# Patient Record
Sex: FEMALE | Race: WHITE | NOT HISPANIC OR LATINO
[De-identification: names, ages, dates, MRNs, and addresses within clinical notes are randomized per-mention and may not be internally consistent; named-entity substitution may affect disease eponyms.]

---

## 2018-05-02 ENCOUNTER — APPOINTMENT (OUTPATIENT)
Dept: ULTRASOUND IMAGING | Facility: CLINIC | Age: 48
End: 2018-05-02

## 2018-05-03 ENCOUNTER — APPOINTMENT (OUTPATIENT)
Dept: ULTRASOUND IMAGING | Facility: CLINIC | Age: 48
End: 2018-05-03
Payer: COMMERCIAL

## 2018-05-03 ENCOUNTER — APPOINTMENT (OUTPATIENT)
Dept: MAMMOGRAPHY | Facility: CLINIC | Age: 48
End: 2018-05-03
Payer: COMMERCIAL

## 2018-05-03 ENCOUNTER — OUTPATIENT (OUTPATIENT)
Dept: OUTPATIENT SERVICES | Facility: HOSPITAL | Age: 48
LOS: 1 days | End: 2018-05-03
Payer: COMMERCIAL

## 2018-05-03 DIAGNOSIS — Z00.00 ENCOUNTER FOR GENERAL ADULT MEDICAL EXAMINATION WITHOUT ABNORMAL FINDINGS: ICD-10-CM

## 2018-05-03 PROCEDURE — 77063 BREAST TOMOSYNTHESIS BI: CPT | Mod: 26

## 2018-05-03 PROCEDURE — 77067 SCR MAMMO BI INCL CAD: CPT | Mod: 26

## 2018-05-03 PROCEDURE — 76641 ULTRASOUND BREAST COMPLETE: CPT | Mod: 26,50

## 2018-05-03 PROCEDURE — 77067 SCR MAMMO BI INCL CAD: CPT

## 2018-05-03 PROCEDURE — 77063 BREAST TOMOSYNTHESIS BI: CPT

## 2018-05-03 PROCEDURE — 76641 ULTRASOUND BREAST COMPLETE: CPT

## 2018-05-04 ENCOUNTER — OUTPATIENT (OUTPATIENT)
Dept: OUTPATIENT SERVICES | Facility: HOSPITAL | Age: 48
LOS: 1 days | End: 2018-05-04
Payer: COMMERCIAL

## 2018-05-04 ENCOUNTER — APPOINTMENT (OUTPATIENT)
Dept: ULTRASOUND IMAGING | Facility: CLINIC | Age: 48
End: 2018-05-04

## 2018-05-04 DIAGNOSIS — Z00.00 ENCOUNTER FOR GENERAL ADULT MEDICAL EXAMINATION WITHOUT ABNORMAL FINDINGS: ICD-10-CM

## 2018-05-04 PROCEDURE — 76642 ULTRASOUND BREAST LIMITED: CPT | Mod: 26,RT

## 2018-05-04 PROCEDURE — 76642 ULTRASOUND BREAST LIMITED: CPT

## 2018-05-29 ENCOUNTER — RESULT REVIEW (OUTPATIENT)
Age: 48
End: 2018-05-29

## 2018-06-18 ENCOUNTER — RESULT REVIEW (OUTPATIENT)
Age: 48
End: 2018-06-18

## 2019-06-19 ENCOUNTER — RECORD ABSTRACTING (OUTPATIENT)
Age: 49
End: 2019-06-19

## 2019-06-19 DIAGNOSIS — Z87.59 PERSONAL HISTORY OF OTHER COMPLICATIONS OF PREGNANCY, CHILDBIRTH AND THE PUERPERIUM: ICD-10-CM

## 2019-06-19 DIAGNOSIS — Z86.19 PERSONAL HISTORY OF OTHER INFECTIOUS AND PARASITIC DISEASES: ICD-10-CM

## 2019-06-19 DIAGNOSIS — Z92.89 PERSONAL HISTORY OF OTHER MEDICAL TREATMENT: ICD-10-CM

## 2019-06-19 DIAGNOSIS — Z80.51 FAMILY HISTORY OF MALIGNANT NEOPLASM OF KIDNEY: ICD-10-CM

## 2019-06-19 DIAGNOSIS — Z80.3 FAMILY HISTORY OF MALIGNANT NEOPLASM OF BREAST: ICD-10-CM

## 2019-06-19 DIAGNOSIS — Z83.3 FAMILY HISTORY OF DIABETES MELLITUS: ICD-10-CM

## 2019-06-19 DIAGNOSIS — Z82.49 FAMILY HISTORY OF ISCHEMIC HEART DISEASE AND OTHER DISEASES OF THE CIRCULATORY SYSTEM: ICD-10-CM

## 2019-06-19 DIAGNOSIS — Z78.9 OTHER SPECIFIED HEALTH STATUS: ICD-10-CM

## 2019-06-19 DIAGNOSIS — Z80.52 FAMILY HISTORY OF MALIGNANT NEOPLASM OF BLADDER: ICD-10-CM

## 2019-06-19 LAB — CYTOLOGY CVX/VAG DOC THIN PREP: NORMAL

## 2019-06-20 ENCOUNTER — APPOINTMENT (OUTPATIENT)
Dept: OBGYN | Facility: CLINIC | Age: 49
End: 2019-06-20
Payer: COMMERCIAL

## 2019-06-20 VITALS
HEIGHT: 64 IN | DIASTOLIC BLOOD PRESSURE: 80 MMHG | WEIGHT: 165 LBS | SYSTOLIC BLOOD PRESSURE: 110 MMHG | BODY MASS INDEX: 28.17 KG/M2

## 2019-06-20 DIAGNOSIS — N60.19 DIFFUSE CYSTIC MASTOPATHY OF UNSPECIFIED BREAST: ICD-10-CM

## 2019-06-20 DIAGNOSIS — Z00.00 ENCOUNTER FOR GENERAL ADULT MEDICAL EXAMINATION W/OUT ABNORMAL FINDINGS: ICD-10-CM

## 2019-06-20 DIAGNOSIS — N87.9 DYSPLASIA OF CERVIX UTERI, UNSPECIFIED: ICD-10-CM

## 2019-06-20 LAB
BILIRUB UR QL STRIP: NORMAL
DATE COLLECTED: NORMAL
GLUCOSE UR-MCNC: NORMAL
HCG UR QL: 0.2 EU/DL
HCG UR QL: NEGATIVE
HEMOCCULT SP1 STL QL: NEGATIVE
HGB UR QL STRIP.AUTO: ABNORMAL
KETONES UR-MCNC: NORMAL
LEUKOCYTE ESTERASE UR QL STRIP: NORMAL
NITRITE UR QL STRIP: NORMAL
PH UR STRIP: 6
PROT UR STRIP-MCNC: NORMAL
QUALITY CONTROL: YES
SP GR UR STRIP: 1.02

## 2019-06-20 PROCEDURE — 99396 PREV VISIT EST AGE 40-64: CPT

## 2019-06-20 PROCEDURE — 81025 URINE PREGNANCY TEST: CPT

## 2019-06-20 PROCEDURE — 82270 OCCULT BLOOD FECES: CPT

## 2019-06-20 PROCEDURE — 81003 URINALYSIS AUTO W/O SCOPE: CPT | Mod: QW

## 2019-06-23 PROBLEM — N87.9 CERVICAL DYSPLASIA: Status: ACTIVE | Noted: 2019-06-19

## 2019-06-23 LAB — HPV HIGH+LOW RISK DNA PNL CVX: NOT DETECTED

## 2019-06-23 NOTE — DISCUSSION/SUMMARY
[FreeTextEntry1] : Pt aware pending pap result, any further tx.  RTO annual yearly.  All the pt's questions and concerns were addressed.

## 2019-06-23 NOTE — PHYSICAL EXAM
[Awake] : awake [Alert] : alert [Soft] : soft [Oriented x3] : oriented to person, place, and time [Labia Majora] : labia major [Labia Minora] : labia minora [Normal] : clitoris [No Bleeding] : there was no active vaginal bleeding [Uterine Adnexae] : were not tender and not enlarged [No Tenderness] : no rectal tenderness [Nl Sphincter Tone] : normal sphincter tone [Acute Distress] : no acute distress [Mass] : no breast mass [Axillary LAD] : no axillary lymphadenopathy [Occult Blood] : occult blood test from digital rectal exam was negative [Tender] : non tender [Nipple Discharge] : no nipple discharge [FreeTextEntry6] : normal  [External Hemorrhoid] : no external hemorrhoids

## 2019-07-15 LAB — CYTOLOGY CVX/VAG DOC THIN PREP: NORMAL

## 2020-03-07 DIAGNOSIS — N89.8 OTHER SPECIFIED NONINFLAMMATORY DISORDERS OF VAGINA: ICD-10-CM

## 2020-12-04 ENCOUNTER — APPOINTMENT (OUTPATIENT)
Dept: OBGYN | Facility: CLINIC | Age: 50
End: 2020-12-04
Payer: COMMERCIAL

## 2020-12-04 VITALS
BODY MASS INDEX: 24.75 KG/M2 | TEMPERATURE: 97.2 F | WEIGHT: 145 LBS | DIASTOLIC BLOOD PRESSURE: 60 MMHG | HEIGHT: 64 IN | SYSTOLIC BLOOD PRESSURE: 100 MMHG

## 2020-12-04 DIAGNOSIS — R92.2 INCONCLUSIVE MAMMOGRAM: ICD-10-CM

## 2020-12-04 DIAGNOSIS — Z12.39 ENCOUNTER FOR OTHER SCREENING FOR MALIGNANT NEOPLASM OF BREAST: ICD-10-CM

## 2020-12-04 DIAGNOSIS — Z01.419 ENCOUNTER FOR GYNECOLOGICAL EXAMINATION (GENERAL) (ROUTINE) W/OUT ABNORMAL FINDINGS: ICD-10-CM

## 2020-12-04 PROCEDURE — 99396 PREV VISIT EST AGE 40-64: CPT

## 2020-12-04 PROCEDURE — 99072 ADDL SUPL MATRL&STAF TM PHE: CPT

## 2020-12-04 NOTE — HISTORY OF PRESENT ILLNESS
[N] : Patient does not use contraception [Monogamous (Male Partner)] : is monogamous with a male partner [Y] : Positive pregnancy history [Night Sweats] : night sweats [Menarche Age: ____] : age at menarche was [unfilled] [Currently Active] : currently active [Men] : men [No] : No [Patient refuses STI testing] : Patient refuses STI testing [Mammogramdate] : 5/2018 [TextBox_19] : PER PT [BreastSonogramDate] : 5/2018 [TextBox_25] : PER PT [PapSmeardate] : 6/20/2019 [TextBox_31] : WNL [LMPDate] : 11/26/2020 [PGHxTotal] : 3 [[facility]Term] : 2 [[facility] : 2 [PGHxABSpont] : 1 [FreeTextEntry1] : 11/26/2020

## 2020-12-04 NOTE — DISCUSSION/SUMMARY
[FreeTextEntry1] : PAP/HPV performed\par \par Reviewed self breast exams, Rx for breast mammo/sono given\par \par Discussed importance of yearly skin checks, good nutrition and exercise.\par \par Pt will RTO in 1 yr foe annual

## 2020-12-10 LAB
CYTOLOGY CVX/VAG DOC THIN PREP: NORMAL
HPV HIGH+LOW RISK DNA PNL CVX: NOT DETECTED

## 2020-12-23 PROBLEM — Z01.419 ENCOUNTER FOR ANNUAL ROUTINE GYNECOLOGICAL EXAMINATION: Status: RESOLVED | Noted: 2019-06-20 | Resolved: 2020-12-23

## 2023-07-21 ENCOUNTER — APPOINTMENT (OUTPATIENT)
Dept: OBGYN | Facility: CLINIC | Age: 53
End: 2023-07-21
Payer: COMMERCIAL

## 2023-07-21 ENCOUNTER — LABORATORY RESULT (OUTPATIENT)
Age: 53
End: 2023-07-21

## 2023-07-21 ENCOUNTER — NON-APPOINTMENT (OUTPATIENT)
Age: 53
End: 2023-07-21

## 2023-07-21 VITALS
DIASTOLIC BLOOD PRESSURE: 87 MMHG | BODY MASS INDEX: 24.75 KG/M2 | HEIGHT: 64 IN | SYSTOLIC BLOOD PRESSURE: 136 MMHG | WEIGHT: 145 LBS

## 2023-07-21 DIAGNOSIS — Z12.4 ENCOUNTER FOR SCREENING FOR MALIGNANT NEOPLASM OF CERVIX: ICD-10-CM

## 2023-07-21 DIAGNOSIS — Z01.411 ENCOUNTER FOR GYNECOLOGICAL EXAMINATION (GENERAL) (ROUTINE) WITH ABNORMAL FINDINGS: ICD-10-CM

## 2023-07-21 DIAGNOSIS — N95.1 MENOPAUSAL AND FEMALE CLIMACTERIC STATES: ICD-10-CM

## 2023-07-21 DIAGNOSIS — R23.2 FLUSHING: ICD-10-CM

## 2023-07-21 PROCEDURE — 99213 OFFICE O/P EST LOW 20 MIN: CPT | Mod: 25

## 2023-07-21 PROCEDURE — 99396 PREV VISIT EST AGE 40-64: CPT

## 2023-07-21 RX ORDER — CLOTRIMAZOLE AND BETAMETHASONE DIPROPIONATE 10; .5 MG/G; MG/G
1-0.05 CREAM TOPICAL 3 TIMES DAILY
Qty: 1 | Refills: 1 | Status: ACTIVE | COMMUNITY
Start: 2020-03-07 | End: 1900-01-01

## 2023-07-21 RX ORDER — ROSUVASTATIN CALCIUM 5 MG/1
TABLET, FILM COATED ORAL
Refills: 0 | Status: ACTIVE | COMMUNITY

## 2023-07-21 RX ORDER — BUSPIRONE HYDROCHLORIDE 7.5 MG/1
TABLET ORAL
Refills: 0 | Status: ACTIVE | COMMUNITY

## 2023-07-21 NOTE — PHYSICAL EXAM
[Chaperone Present] : A chaperone was present in the examining room during all aspects of the physical examination [FreeTextEntry1] : Kelli MANCUSO MA  [Appropriately responsive] : appropriately responsive [Alert] : alert [No Acute Distress] : no acute distress [Oriented x3] : oriented x3 [Examination Of The Breasts] : a normal appearance [No Masses] : no breast masses were palpable [Labia Majora] : normal [Labia Minora] : normal [No Bleeding] : There was no active vaginal bleeding [Normal] : normal [Uterine Adnexae] : normal

## 2023-07-21 NOTE — DISCUSSION/SUMMARY
[FreeTextEntry1] : -Pap completed today, will f/u results\par -Encouraged self breast exams, pt up to date with mammo/sono she completed rx provided by PCP, will call for results\par -Education provided for management options of menopausal symptoms along with their respective effectiveness and risks. These options include OTC supplements, SSRI's and vaginal estrogen therapy, and HRT. HRT options discussed, pt does not wish to start HRT. After further discussion, patient desires to continue with OTC supplements for vaginal dryness, advised of bonafide and NeuEve supplements. We also discussed Effexor for anxiety and hot flashes, advised pt to inquire with PCP who is prescribing her buspirone for management. Pt appreciative\par -FU annually or sooner if needed. All questions answered

## 2023-07-21 NOTE — HISTORY OF PRESENT ILLNESS
[N] : Patient does not use contraception [Y] : Positive pregnancy history [Menarche Age: ____] : age at menarche was [unfilled] [Currently Active] : currently active [Mammogramdate] : 2023 [TextBox_19] : DONE IN [place] [PapSmeardate] : 12/04/2020 [TextBox_31] : WNL [HPVDate] : 12/04/2020 [TextBox_78] : NEG [LMPDate] : 2/2021 [PGHxTotal] : 3 [[facility]] : 2 [[facility] 2 [Hot Flashes] : hot flashes [Difficulty with [place]] : difficulty with intercourse [Options Discussed] : options discussed [FreeTextEntry1] : 2/2021

## 2023-07-26 LAB — HPV HIGH+LOW RISK DNA PNL CVX: DETECTED

## 2023-08-01 ENCOUNTER — NON-APPOINTMENT (OUTPATIENT)
Age: 53
End: 2023-08-01

## 2023-08-01 LAB — CYTOLOGY CVX/VAG DOC THIN PREP: ABNORMAL
